# Patient Record
Sex: MALE | Race: BLACK OR AFRICAN AMERICAN | Employment: FULL TIME | URBAN - METROPOLITAN AREA
[De-identification: names, ages, dates, MRNs, and addresses within clinical notes are randomized per-mention and may not be internally consistent; named-entity substitution may affect disease eponyms.]

---

## 2019-02-11 ENCOUNTER — OFFICE VISIT (OUTPATIENT)
Dept: ENDOCRINOLOGY | Facility: CLINIC | Age: 54
End: 2019-02-11
Payer: COMMERCIAL

## 2019-02-11 VITALS
SYSTOLIC BLOOD PRESSURE: 108 MMHG | DIASTOLIC BLOOD PRESSURE: 66 MMHG | HEART RATE: 81 BPM | BODY MASS INDEX: 36.45 KG/M2 | WEIGHT: 315 LBS | HEIGHT: 78 IN

## 2019-02-11 DIAGNOSIS — E89.0 POSTABLATIVE HYPOTHYROIDISM: Primary | ICD-10-CM

## 2019-02-11 DIAGNOSIS — E78.5 HYPERLIPIDEMIA LDL GOAL <130: ICD-10-CM

## 2019-02-11 DIAGNOSIS — M1A.9XX0 CHRONIC GOUT WITHOUT TOPHUS, UNSPECIFIED CAUSE, UNSPECIFIED SITE: ICD-10-CM

## 2019-02-11 PROCEDURE — 99215 OFFICE O/P EST HI 40 MIN: CPT | Performed by: INTERNAL MEDICINE

## 2019-02-11 RX ORDER — LEVOTHYROXINE SODIUM 100 UG/1
TABLET ORAL
Qty: 90 TABLET | Refills: 3 | Status: SHIPPED | OUTPATIENT
Start: 2019-02-11 | End: 2019-05-02

## 2019-02-11 RX ORDER — LEVOTHYROXINE SODIUM 88 UG/1
TABLET ORAL
Qty: 90 TABLET | Refills: 3 | Status: SHIPPED | OUTPATIENT
Start: 2019-02-11 | End: 2019-05-02

## 2019-02-11 ASSESSMENT — MIFFLIN-ST. JEOR: SCORE: 2476.92

## 2019-02-11 NOTE — LETTER
2/11/2019         RE: Pablo Wade  [address]  [place] [address]        Dear Colleague,    Thank you for referring your patient, Pablo Wade, to the [facility]. Please see a copy of my visit note below.    .    Name: Pablo Wade is a 53 year old man, self-referred for evaluation of thyroid disease.  Previously seen by me at my former clinic ([facility]), here to establish care with [facility].    Chief Complaint   Patient presents with     Thyroid Problem     hypothyroidism       HPI:  Recent issues:  Here for evaluation of hypothyroidism  Reviewed medical history from patient and Epic chart record        1983-85. Previously played college basketball for [employer]  [place]ayed in [place] with [place]), [place] (K[place] (Celtics), [place] (76ers[place] and [place] ([place]  1997. 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 Medical evaluation with me at my former clinic ([facility])  Dose increase with levothyroxine med.  Previous [facility] labs include:      Current dose:  Levothyroxine 0.188 mg daily      , 4 children, lives in [place]  Former college and professional , now  for [employer]  Sees Dr. Fredi Morejon/[facility] for  general medicine evaluations.    PMH/PSH:  Past Medical History:   Diagnosis Date     Gout      Heartburn      Hyperlipidemia      Hyperthyroidism      Postablative hypothyroidism      Past Surgical History:   Procedure Laterality Date     KNEE SURGERY Left        Family Hx:  No family history on file.      Social Hx:  Social History     Socioeconomic History     Marital status:      Spouse name: Not on file     Number of children: Not on file     Years of education: Not on file     Highest education level: Not on file   Social Needs     Financial resource strain: Not on file     Food insecurity - worry: Not on file     Food insecurity - inability: Not on file     Transportation needs - medical: Not on file     Transportation needs - non-medical: Not on file   Occupational History     Not on file   Tobacco Use     Smoking status: Never Smoker     Smokeless tobacco: Never Used   Substance and Sexual Activity     Alcohol use: Not on file     Drug use: Not on file     Sexual activity: Not on file   Other Topics Concern     Not on file   Social History Narrative     Not on file          MEDICATIONS:  has a current medication list which includes the following prescription(s): levothyroxine and levothyroxine.    ROS:     ROS: 10 point ROS neg other than the symptoms noted above in the HPI.    GENERAL: mild fatigue, wt loss 40#/9 mo; denies fevers, chills, night sweats.    HEENT: no dysphagia, odonophagia, diplopia, neck pain  THYROID:  no apparent hyper or hypothyroid symptoms  CV: no chest pain, pressure, palpitations  LUNGS: no SOB, BECERRA, cough, wheezing   ABDOMEN: no diarrhea, constipation, abdominal pain  EXTREMITIES: no rashes, ulcers, edema  NEUROLOGY: no headaches, denies changes in vision, tingling, extremitiy numbness   MSK: no muscle aches or pains, weakness  SKIN: no rashes or lesions  : sleeping better at night; denies nocturia  PSYCH:  stable mood, no significant anxiety or depression  ENDOCRINE: no  "heat or cold intolerance    Physical Exam   VS: /66   Pulse 81   Ht 2.057 m (6' 9\")   Wt 146.1 kg (322 lb 1.6 oz)   BMI 34.52 kg/m     GENERAL: AXOX3, NAD, tall, well dressed, answering questions appropriately, appears stated age.  THYROID:  normal gland, no apparent nodules or goiter  HEENT: neck non-tender, no exopthalmous, no proptosis, EOMI  CV: RRR, no rubs, gallops, no murmurs  LUNGS: CTAB, no wheezes, rales, or ronchi  ABDOMEN: mildly obese, soft, nontender  EXTREMITIES: no edema, no lesions  NEUROLOGY: CN grossly intact, no tremors  MSK: grossly intact  SKIN: dark complexion, scalp balding; no rashes, no lesions    LABS:    All pertinent notes, labs, and images personally reviewed by me.     A/P:  Encounter Diagnoses   Name Primary?     Postablative hypothyroidism Yes     Hyperlipidemia LDL goal <130      Chronic gout without tophus, unspecified cause, unspecified site        Comments:  Reviewed health history and hypothyroidism issues.  I am pleased with patient's weight loss and improved energy level.    Plan:  Discussed general issues with the postablative hypothyroidism diagnosis and management  Reviewed thyroid gland anatomy and hormone physiology  Discussed lab tests used to assess patient thyroid hormone levels  Reviewed treatment options including levothyroxine medication, ideal dosing    Recommend:  Continue current levothyroxine 0.188 mg daily dose  Need to check lab tests soon to reassess levels   Plan fasting lab appt for thyroid, lipid, glucose levels   Also check uric acid level  Updated levothyroxine Rx to his local [facility] pharmacy  Monitor for symptom changes  Keep focus on diet, exercise, weight management    Addressed patient questions today    Future labs ordered today:   Orders Placed This Encounter   Procedures     T4 free     TSH     Basic metabolic panel     Lipid panel reflex to direct LDL Fasting     Uric acid     Radiology/Consults ordered today: None    More than 50% of " the time spent with Mr. Wade on counseling / coordinating his care.  Total appointment time was 40 minutes.    Follow-up:  6 mo or prn    Jerrell Powers MD  Endocrinology  [facility]        Again, thank you for allowing me to participate in the care of your patient.        Sincerely,        Jerrell Powers MD

## 2019-02-12 NOTE — PROGRESS NOTES
Name: Pablo Wade is a 53 year old man, self-referred for evaluation of thyroid disease.  Previously seen by me at my former clinic ([facility]), here to establish care with [facility].    Chief Complaint   Patient presents with     Thyroid Problem     hypothyroidism       HPI:  Recent issues:  Here for evaluation of hypothyroidism  Reviewed medical history from patient and Epic chart record        1983-85. 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 Medical evaluation with me at my former clinic ([facility])  Dose increase with levothyroxine med.  Previous [facility] labs include:      Current dose:  Levothyroxine 0.188 mg daily      , 4 children, lives in [facility]  Former college and professional , now  for [employer]  Sees Dr. Fredi Morejon/[facility] for general medicine evaluations.    PMH/PSH:  Past Medical History:   Diagnosis Date     Gout      Heartburn      Hyperlipidemia      Hyperthyroidism      Postablative hypothyroidism      Past Surgical History:   Procedure Laterality Date     KNEE SURGERY Left   "      Family Hx:  No family history on file.      Social Hx:  Social History     Socioeconomic History     Marital status:      Spouse name: Not on file     Number of children: Not on file     Years of education: Not on file     Highest education level: Not on file   Social Needs     Financial resource strain: Not on file     Food insecurity - worry: Not on file     Food insecurity - inability: Not on file     Transportation needs - medical: Not on file     Transportation needs - non-medical: Not on file   Occupational History     Not on file   Tobacco Use     Smoking status: Never Smoker     Smokeless tobacco: Never Used   Substance and Sexual Activity     Alcohol use: Not on file     Drug use: Not on file     Sexual activity: Not on file   Other Topics Concern     Not on file   Social History Narrative     Not on file          MEDICATIONS:  has a current medication list which includes the following prescription(s): levothyroxine and levothyroxine.    ROS:     ROS: 10 point ROS neg other than the symptoms noted above in the HPI.    GENERAL: mild fatigue, wt loss 40#/9 mo; denies fevers, chills, night sweats.    HEENT: no dysphagia, odonophagia, diplopia, neck pain  THYROID:  no apparent hyper or hypothyroid symptoms  CV: no chest pain, pressure, palpitations  LUNGS: no SOB, BECERRA, cough, wheezing   ABDOMEN: no diarrhea, constipation, abdominal pain  EXTREMITIES: no rashes, ulcers, edema  NEUROLOGY: no headaches, denies changes in vision, tingling, extremitiy numbness   MSK: no muscle aches or pains, weakness  SKIN: no rashes or lesions  : sleeping better at night; denies nocturia  PSYCH:  stable mood, no significant anxiety or depression  ENDOCRINE: no heat or cold intolerance    Physical Exam   VS: /66   Pulse 81   Ht 2.057 m (6' 9\")   Wt 146.1 kg (322 lb 1.6 oz)   BMI 34.52 kg/m    GENERAL: AXOX3, NAD, tall, well dressed, answering questions appropriately, appears stated age.  THYROID:  normal " gland, no apparent nodules or goiter  HEENT: neck non-tender, no exopthalmous, no proptosis, EOMI  CV: RRR, no rubs, gallops, no murmurs  LUNGS: CTAB, no wheezes, rales, or ronchi  ABDOMEN: mildly obese, soft, nontender  EXTREMITIES: no edema, no lesions  NEUROLOGY: CN grossly intact, no tremors  MSK: grossly intact  SKIN: dark complexion, scalp balding; no rashes, no lesions    LABS:    All pertinent notes, labs, and images personally reviewed by me.     A/P:  Encounter Diagnoses   Name Primary?     Postablative hypothyroidism Yes     Hyperlipidemia LDL goal <130      Chronic gout without tophus, unspecified cause, unspecified site        Comments:  Reviewed health history and hypothyroidism issues.  I am pleased with patient's weight loss and improved energy level.    Plan:  Discussed general issues with the postablative hypothyroidism diagnosis and management  Reviewed thyroid gland anatomy and hormone physiology  Discussed lab tests used to assess patient thyroid hormone levels  Reviewed treatment options including levothyroxine medication, ideal dosing    Recommend:  Continue current levothyroxine 0.188 mg daily dose  Need to check lab tests soon to reassess levels   Plan fasting lab appt for thyroid, lipid, glucose levels   Also check uric acid level  Updated levothyroxine Rx to his local [facility] pharmacy  Monitor for symptom changes  Keep focus on diet, exercise, weight management    Addressed patient questions today    Future labs ordered today:   Orders Placed This Encounter   Procedures     T4 free     TSH     Basic metabolic panel     Lipid panel reflex to direct LDL Fasting     Uric acid     Radiology/Consults ordered today: None    More than 50% of the time spent with Mr. Wade on counseling / coordinating his care.  Total appointment time was 40 minutes.    Follow-up:  6 mo or prn    Jerrell Powers MD  Endocrinology  [facility]

## 2019-04-30 DIAGNOSIS — E89.0 POSTABLATIVE HYPOTHYROIDISM: ICD-10-CM

## 2019-05-01 RX ORDER — LEVOTHYROXINE SODIUM 88 UG/1
TABLET ORAL
Refills: 0
Start: 2019-05-01

## 2019-05-01 RX ORDER — LEVOTHYROXINE SODIUM 100 UG/1
TABLET ORAL
Refills: 0
Start: 2019-05-01

## 2019-05-01 NOTE — TELEPHONE ENCOUNTER
"Requested Prescriptions   Pending Prescriptions Disp Refills     levothyroxine (SYNTHROID/LEVOTHROID) 88 MCG tablet [Pharmacy Med Name: LEVOTHYROXINE 0.088MG (88MCG) TAB] 30 tablet 0     Sig: TAKE 1 TABLET BY MOUTH EVERY DAY       Thyroid Protocol Failed - 4/30/2019  7:48 AM        Failed - Normal TSH on file in past 12 months     No lab results found.           Passed - Patient is 12 years or older        Passed - Recent (12 mo) or future (30 days) visit within the authorizing provider's specialty     Patient had office visit in the last 12 months or has a visit in the next 30 days with authorizing provider or within the authorizing provider's specialty.  See \"Patient Info\" tab in inbasket, or \"Choose Columns\" in Meds & Orders section of the refill encounter.              Passed - Medication is active on med list        levothyroxine (SYNTHROID/LEVOTHROID) 100 MCG tablet [Pharmacy Med Name: LEVOTHYROXINE 0.100MG (100MCG) TAB] 30 tablet 0     Sig: TAKE 1 TABLET BY MOUTH EVERY DAY       Thyroid Protocol Failed - 4/30/2019  7:48 AM        Failed - Normal TSH on file in past 12 months     No lab results found.           Passed - Patient is 12 years or older        Passed - Recent (12 mo) or future (30 days) visit within the authorizing provider's specialty     Patient had office visit in the last 12 months or has a visit in the next 30 days with authorizing provider or within the authorizing provider's specialty.  See \"Patient Info\" tab in inbasket, or \"Choose Columns\" in Meds & Orders section of the refill encounter.              Passed - Medication is active on med list        Last Written Prescription Date:  02/11/19  Last Fill Quantity: 90,  # refills: 3   Last office visit: 2/11/2019 with prescribing provider:     Future Office Visit:      Adelita Malcolm MA      "

## 2019-05-02 DIAGNOSIS — E89.0 POSTABLATIVE HYPOTHYROIDISM: ICD-10-CM

## 2019-05-02 RX ORDER — LEVOTHYROXINE SODIUM 88 UG/1
TABLET ORAL
Qty: 90 TABLET | Refills: 3 | Status: SHIPPED | OUTPATIENT
Start: 2019-05-02 | End: 2020-04-22

## 2019-05-02 RX ORDER — LEVOTHYROXINE SODIUM 100 UG/1
TABLET ORAL
Qty: 90 TABLET | Refills: 3 | Status: SHIPPED | OUTPATIENT
Start: 2019-05-02 | End: 2020-04-22

## 2020-04-22 DIAGNOSIS — E89.0 POSTABLATIVE HYPOTHYROIDISM: ICD-10-CM

## 2020-04-22 RX ORDER — LEVOTHYROXINE SODIUM 100 UG/1
TABLET ORAL
Qty: 90 TABLET | Refills: 1 | Status: SHIPPED | OUTPATIENT
Start: 2020-04-22 | End: 2020-11-30

## 2020-04-22 RX ORDER — LEVOTHYROXINE SODIUM 88 UG/1
TABLET ORAL
Qty: 90 TABLET | Refills: 1 | Status: SHIPPED | OUTPATIENT
Start: 2020-04-22 | End: 2020-11-30

## 2020-11-23 DIAGNOSIS — E89.0 POSTABLATIVE HYPOTHYROIDISM: ICD-10-CM

## 2020-11-30 RX ORDER — LEVOTHYROXINE SODIUM 100 UG/1
TABLET ORAL
Qty: 90 TABLET | Refills: 0 | Status: SHIPPED | OUTPATIENT
Start: 2020-11-30

## 2020-11-30 RX ORDER — LEVOTHYROXINE SODIUM 88 UG/1
TABLET ORAL
Qty: 90 TABLET | Refills: 0 | Status: SHIPPED | OUTPATIENT
Start: 2020-11-30